# Patient Record
Sex: FEMALE | Race: WHITE | NOT HISPANIC OR LATINO | Employment: OTHER | URBAN - METROPOLITAN AREA
[De-identification: names, ages, dates, MRNs, and addresses within clinical notes are randomized per-mention and may not be internally consistent; named-entity substitution may affect disease eponyms.]

---

## 2022-08-02 ENCOUNTER — TRANSFERRED RECORDS (OUTPATIENT)
Dept: HEALTH INFORMATION MANAGEMENT | Facility: CLINIC | Age: 67
End: 2022-08-02

## 2023-03-10 ENCOUNTER — TRANSFERRED RECORDS (OUTPATIENT)
Dept: HEALTH INFORMATION MANAGEMENT | Facility: CLINIC | Age: 68
End: 2023-03-10

## 2023-07-26 ENCOUNTER — TRANSFERRED RECORDS (OUTPATIENT)
Dept: HEALTH INFORMATION MANAGEMENT | Facility: CLINIC | Age: 68
End: 2023-07-26

## 2023-07-27 ENCOUNTER — MEDICAL CORRESPONDENCE (OUTPATIENT)
Dept: HEALTH INFORMATION MANAGEMENT | Facility: CLINIC | Age: 68
End: 2023-07-27

## 2023-07-28 ENCOUNTER — TRANSCRIBE ORDERS (OUTPATIENT)
Dept: OTHER | Age: 68
End: 2023-07-28

## 2023-07-28 DIAGNOSIS — G43.709 CHRONIC MIGRAINE WITHOUT AURA OR STATUS MIGRAINOSUS: Primary | ICD-10-CM

## 2023-11-30 ENCOUNTER — VIRTUAL VISIT (OUTPATIENT)
Dept: NEUROLOGY | Facility: CLINIC | Age: 68
End: 2023-11-30
Attending: FAMILY MEDICINE
Payer: COMMERCIAL

## 2023-11-30 DIAGNOSIS — G43.709 CHRONIC MIGRAINE WITHOUT AURA WITHOUT STATUS MIGRAINOSUS, NOT INTRACTABLE: Primary | ICD-10-CM

## 2023-11-30 PROCEDURE — 99205 OFFICE O/P NEW HI 60 MIN: CPT | Mod: VID | Performed by: PSYCHIATRY & NEUROLOGY

## 2023-11-30 NOTE — NURSING NOTE
Is the patient currently in the state of [place]? YES    Visit mode:VIDEO    If the visit is dropped, the patient can be reconnected by: VIDEO VISIT: Send to e-mail at: No e-mail address on record    Will anyone else be joining the visit? NO  (If patient encounters technical issues they should call [phone]    How would you like to obtain your AVS? MyChart    Are changes needed to the allergy or medication list? No    Reason for visit: Video Visit (New apt )    Anel GONZALEZ

## 2023-11-30 NOTE — PROGRESS NOTES
Virtual Visit Details    Type of service:  Video Visit   Video Start Time: 12:38 PM  Video End Time: 1:30pm     Originating Location (pt. Location): Home    Distant Location (provider location):  On-site  Platform used for Video Visit: [facility]   Virtual Headache Neurology Consult  November 30, 2023     Sadie Duenas MRN# [id number]   YOB: 1955 Age: 68 year old     Requesting physician: Lexi Griffith         Assessment and Recommendations:     Sadie Duenas is a 68 year old female who presents for further evaluation of headache.    Her headache presentation meets criteria for chronic migraine without aura.  I suspect she has this on a genetic basis.  Her headache presentation is complicated by hypothyroidism, kidney stones.    Her virtual neurologic examination is intact today.  She has had previous head imaging, including an MRI of the brain at an outside facility in 2022, which per report, is unrevealing for secondary causes of headache.    She has tried a number of therapies for acute and preventative treatment of migraine, with some success.  Currently, rizatriptan used acutely and gabapentin preventatively have been significantly beneficial.  We reviewed her symptomatic treatment strategy:  -For acute treatment of mild headache, she continues over-the-counter NSAIDs as needed.  This should not exceed more than 14 days/month to avoid medication overuse.  -For acute treatment of moderate to severe headache, she continues rizatriptan 10 mg oral dissolvable tablet at the onset of headache.  Repeat dose could be utilized 2 hours later if needed.  -If rizatriptan is not effective or not tolerated in the future, sumatriptan 6 mg injection, eletriptan 40 mg tablet could be considered as alternatives.  -For headache related nausea, or as a rescue medicine for headache, prochlorperazine IV has been beneficial previously.  This could be utilized in oral tablet form to allow treatment at  home, if rescue treatment is needed.  I would suggest 10 mg prochlorperazine taken with 25 mg diphenhydramine and sleeping.  -For muscle tension, she will continue cyclobenzaprine as needed.  -She had some benefit with occipital nerve blocks in the past. These could be repeated if needed in the future.    Her headache frequency and severity warrant prevention.  She has trialed a number of treatments, including medications from the antidepressant, antiseizure, antihypertensive medication categories, botulinum toxin injections, and CGRP inhibitors.  -She currently is receiving benefit from gabapentin 100 mg in the morning, 200 mg at noon, and 500 mg at night.  If needed, this could be further titrated up, adding 100 mg each week, as needed and tolerated, to a goal of 600 mg 3 times per day.  -Other preventative therapies could be considered in the future, if needed.  These could include pregabalin, Depakote, verapamil, duloxetine, Emgality, Qulipta, retrial of botulinum toxin injections.    As an adjunct to the above, we discussed lifestyle and behavioral measures that have data for headache prevention.  -Trigger avoidance was discussed.  Avoiding excessive caffeine, above 200 mg daily, is suggested.  -Routines were reviewed regarding sleeping and diet.  Meditation, yoga, acupuncture, cognitive behavioral therapy, acceptance and commitment therapy, biofeedback, could be considered.  -She describes some difficulty with her sleep.  She reports a sleep study is scheduled.  I agree with this assessment.  If she were to have a sleep disorder, such as obstructive sleep apnea, this may be contributing to her headache frequency and treatment would be recommended.  -We also discussed headache devices that are currently available.  In particular, we discussed the Cefaly device.  This is now available over-the-counter.  I suggest utilizing this 60 minutes acutely and 20 minutes nightly for headache prevention.    I spent 68  minutes on patient care and documentation.  I will see her back as needed in the future.    Nguyen Callahan MD  Neurology            Chief Complaint:     Chief Complaint   Patient presents with    Video Visit     New apt            History is obtained from the patient and medical record.  Patient was seen via a virtual visit in their home.      Sadie Duenas is a 68 year old female who has been living with headaches since her 40's. She had progressive worsening over years.    Headaches are right frontal and radiate to the neck. They are pounding and severe. They rate 10/10. They last 1-4 days. They typically start at night.    She cannot sleep on her right side.    She has associated nausea and vomiting, photophobia, phonophobia. She prefers to lay down but this doesn't relieve pain; laying on her head can be triggering.    She denies unilateral autonomic features, denies fevers, other illness associated, denies typical aura.    Before treatment, she had 30/30 headache days per month, with 30/30 severe headache days per month.    She takes rizatriptan 10 mg ODT as needed. It takes 2 hours to work.  She takes Excedrin, ibuprofen as needed for mild headache.    In the past, she tried sumatriptan oral and zolmitriptan NS. These worked well for her.   Nurtec was not effective.    Since starting gabapentin on August 17th, she has noticed benefit. She only had 11/30 headache days per month, less severe than before. She takes gabapentin 100 mg AM, 200 mg noon, and 500 mg PM. She had sleepiness with higher daytime doses.    She also takes flexeril as needed for left-sided neck tension.    In the past, she tried:  Propranolol - no effect  Atenolol - no effect  Nortriptyline - ? effect  Venlafaxine - strange dreams  Zonisamide - no effect  Topiramate - no effect,  kidney stones  Ajovy - no effect after 2 weeks  Aimovig - no effect after 2 weeks  Botox - two rounds, ? effect  Magnesium - current, no effect  Riboflavin - current,  no effect  Tizandine - previous  Cyclobenzaprine - current    She tried ONBs, with possible benefit    Acupuncture in ears helpful for one session.    She has had kidney stones.    UTD on eye exams.            Past Medical History:   Kidney stones  Hypothyroidism  Hyperparathyroidism   Hyperlipidemia          Past Surgical History:   Parathyroid removal x 2   x 3  Kidney stone removal x 2  Cholecystectomy          Social History:   She is . She has 3 biological children and 4 step children.    She is retired.     She works part time for her [employer].    She denies smoking, denies alcohol and drug use.    She drinks caffeine, about 4 cups of coffee per day.          Family History:   Daughter with migraine.          Allergies:   Penicillin, erythromycin           Medications:   Levothyroxine   Simvastatin 20 mg daily  Melatonin   Gabapentin TID   Hydrochlorothiazide           Physical Exam:   There were no vitals taken for this visit.   Physical Exam:   General: NAD  Neurologic:   Mental Status Exam: Alert, awake and oriented to situation. No dysarthria. Speech of normal fluency.   Cranial Nerves: Pupils equal, EOMs intact, no nystagmus, facial movements symmetric, hearing intact to conversation, tongue midline and fully mobile. No tongue atrophy or fasciculations.    Motor: No drift in upper extremities. Able to stand from a seated position without use of arms. No tremors or abnormal movements noted.   Coordination: With arms outstretched, able to touch nose using index finger accurately bilaterally.  Normal finger tapping bilaterally.     Gait: Normal stance and casual gait.    Neck: Normal range of motion with lateral head movements and neck flexion.  Eyes: No conjunctival injection, no scleral icterus.           Data:     MRI brain (8/3/2022):  No acute intracranial abnormality.

## 2024-08-25 ENCOUNTER — HEALTH MAINTENANCE LETTER (OUTPATIENT)
Age: 69
End: 2024-08-25